# Patient Record
Sex: MALE | URBAN - METROPOLITAN AREA
[De-identification: names, ages, dates, MRNs, and addresses within clinical notes are randomized per-mention and may not be internally consistent; named-entity substitution may affect disease eponyms.]

---

## 2024-07-25 ENCOUNTER — TELEPHONE (OUTPATIENT)
Age: 59
End: 2024-07-25

## 2024-07-25 NOTE — TELEPHONE ENCOUNTER
Caller: Patient     Doctor: Clement-previous patient     Reason for call: Patient was asking if Dr Vaughan would have his medical records from when he treated with him at [facility]. Advised him that he would not, they would stay with the practice-which merged with [facility]. I checked in Care Everywhere but no information was there. I looked up the phone number for [facility] med recs dept and advised patient they have a form online that he could submit via fax or email as well.  He will call them     Call back#: n/a

## 2024-09-04 ENCOUNTER — TELEPHONE (OUTPATIENT)
Age: 59
End: 2024-09-04

## 2024-09-04 NOTE — TELEPHONE ENCOUNTER
Caller: Patient     Doctor/Office: Clement    Call regarding :  Returning call to in regards to insurance information      Call was transferred to: Chiro

## 2024-09-10 ENCOUNTER — OFFICE VISIT (OUTPATIENT)
Age: 59
End: 2024-09-10
Payer: COMMERCIAL

## 2024-09-10 VITALS
SYSTOLIC BLOOD PRESSURE: 140 MMHG | DIASTOLIC BLOOD PRESSURE: 88 MMHG | HEART RATE: 85 BPM | BODY MASS INDEX: 34.86 KG/M2 | HEIGHT: 68 IN | WEIGHT: 230 LBS

## 2024-09-10 DIAGNOSIS — M54.2 NECK PAIN: Primary | ICD-10-CM

## 2024-09-10 DIAGNOSIS — M21.371 FOOT DROP, RIGHT: ICD-10-CM

## 2024-09-10 DIAGNOSIS — M54.41 CHRONIC BILATERAL LOW BACK PAIN WITH RIGHT-SIDED SCIATICA: ICD-10-CM

## 2024-09-10 DIAGNOSIS — M79.18 MYOFASCIAL PAIN: ICD-10-CM

## 2024-09-10 DIAGNOSIS — S16.1XXA STRAIN OF NECK MUSCLE, INITIAL ENCOUNTER: ICD-10-CM

## 2024-09-10 DIAGNOSIS — G89.29 CHRONIC BILATERAL LOW BACK PAIN WITH RIGHT-SIDED SCIATICA: ICD-10-CM

## 2024-09-10 PROCEDURE — 99204 OFFICE O/P NEW MOD 45 MIN: CPT | Performed by: CHIROPRACTOR

## 2024-09-11 ENCOUNTER — TELEPHONE (OUTPATIENT)
Age: 59
End: 2024-09-11

## 2024-09-11 NOTE — TELEPHONE ENCOUNTER
I called O  ([facility]) at [phone] #1 and also [phone] MRO# [id number] to try to get imaging of patients study 2-9-2004 pushed through electronically do to wrong date of birth. Image is in [place] but was told they could not find it with wrong date of birth or correct date of birth at this time.

## 2024-09-19 ENCOUNTER — PROCEDURE VISIT (OUTPATIENT)
Age: 59
End: 2024-09-19
Payer: OTHER MISCELLANEOUS

## 2024-09-19 VITALS — BODY MASS INDEX: 34.86 KG/M2 | HEIGHT: 68 IN | WEIGHT: 230 LBS

## 2024-09-19 DIAGNOSIS — M54.16 LUMBAR RADICULOPATHY: ICD-10-CM

## 2024-09-19 DIAGNOSIS — M21.371 FOOT DROP, RIGHT: ICD-10-CM

## 2024-09-19 DIAGNOSIS — G89.29 CHRONIC BILATERAL LOW BACK PAIN WITH RIGHT-SIDED SCIATICA: ICD-10-CM

## 2024-09-19 DIAGNOSIS — M54.2 NECK PAIN: Primary | ICD-10-CM

## 2024-09-19 DIAGNOSIS — M54.41 CHRONIC BILATERAL LOW BACK PAIN WITH RIGHT-SIDED SCIATICA: ICD-10-CM

## 2024-09-19 PROCEDURE — 98941 CHIROPRACT MANJ 3-4 REGIONS: CPT | Performed by: CHIROPRACTOR

## 2024-09-19 NOTE — TELEPHONE ENCOUNTER
Caller: Patient    Doctor: Clement    Reason for call: Patient was in to see you and wants to follow up during the time you are on vacation and wondered if he would be able to see Dr. Redman on [facility] [place] during that time. Please call patient. Thank you.    Call back#: [phone]

## 2024-09-23 ENCOUNTER — OFFICE VISIT (OUTPATIENT)
Age: 59
End: 2024-09-23
Payer: OTHER MISCELLANEOUS

## 2024-09-23 VITALS — BODY MASS INDEX: 34.86 KG/M2 | WEIGHT: 230 LBS | HEIGHT: 68 IN

## 2024-09-23 DIAGNOSIS — M21.371 FOOT DROP, RIGHT: ICD-10-CM

## 2024-09-23 DIAGNOSIS — M54.2 NECK PAIN: Primary | ICD-10-CM

## 2024-09-23 DIAGNOSIS — M54.16 LUMBAR RADICULOPATHY: ICD-10-CM

## 2024-09-23 DIAGNOSIS — G89.29 CHRONIC BILATERAL LOW BACK PAIN WITH RIGHT-SIDED SCIATICA: ICD-10-CM

## 2024-09-23 DIAGNOSIS — M54.41 CHRONIC BILATERAL LOW BACK PAIN WITH RIGHT-SIDED SCIATICA: ICD-10-CM

## 2024-09-23 PROCEDURE — 98941 CHIROPRACT MANJ 3-4 REGIONS: CPT | Performed by: CHIROPRACTOR

## 2024-09-23 PROCEDURE — 97140 MANUAL THERAPY 1/> REGIONS: CPT | Performed by: CHIROPRACTOR

## 2024-09-30 NOTE — PROGRESS NOTES
HPI:  Casper returns for treatment in regards to neck pain upper back pain increased lower back and right lower extremity pain.  He is a 7 on a pain scale.  He notes increased pain in his dropfoot.      The following portions of the patient's history were reviewed and updated as appropriate: allergies, current medications, past family history, past medical history, past social history, past surgical history, and problem list.    Review of Systems    Physical Exam:  Exam today reveals a pelvic obliquity leg with any quality biomechanically joint dysfunction right sacroiliac joint involvement L4-L5 L5-S1 active myofascial triggers right gluteus medius, piriformis, TFL, and iliotibial band.  Lumbar range of motion reduced.    Paracervical hypertonicity noted myofascial involvement of the bilateral trapezii, rhomboid, and levator scapula musculature.  Decreased cervical range of motion.  Joint dysfunction T4-T5 C5-C6    Assessment:   Diagnosis ICD-10-CM Associated Orders   1. Neck pain  M54.2       2. Chronic bilateral low back pain with right-sided sciatica  M54.41     G89.29       3. Lumbar radiculopathy  M54.16       4. Foot drop, right  M21.371                 Treatment: 25691  Manipulation to the right innominate, sacrum, L5 via drop maneuver well-tolerated pretreat MFR right hip    Therapeutic myofascial release to bilateral shoulder girdle utilizing deep tissue compete ischemic compression and mobilization techniques of bilateral shoulder girdles well-tolerated 12-minute procedure.    Discussion:  Once again discussed the case with the patient today continue on with treatment as well as his previously established protocol.

## 2024-09-30 NOTE — PROGRESS NOTES
HPI:  Casper returns for treatment in regards to neck pain upper back pain increased lower back and right lower extremity pain.  He is a 7-8 on a pain scale.  He notes increased pain in his dropfoot.      The following portions of the patient's history were reviewed and updated as appropriate: allergies, current medications, past family history, past medical history, past social history, past surgical history, and problem list.    Review of Systems    Physical Exam:  Exam today reveals a pelvic obliquity leg with any quality biomechanically joint dysfunction right sacroiliac joint involvement L4-L5 L5-S1 active myofascial triggers right gluteus medius, piriformis, TFL, and iliotibial band.  Lumbar range of motion reduced.    Paracervical hypertonicity noted myofascial involvement of the bilateral trapezii, rhomboid, and levator scapula musculature.  Decreased cervical range of motion.  Joint dysfunction T4-T5 C5-C6    Assessment:   Diagnosis ICD-10-CM Associated Orders   1. Neck pain  M54.2       2. Chronic bilateral low back pain with right-sided sciatica  M54.41     G89.29       3. Lumbar radiculopathy  M54.16       4. Foot drop, right  M21.371                 Treatment: 69683  Manipulation to the right innominate, sacrum, L5 via drop maneuver well-tolerated pretreat MFR right hip    Discussion:  Once again discussed the case with the patient today continue on with treatment as well as his previously established protocol.

## 2024-09-30 NOTE — PROGRESS NOTES
"    HPI:  59-year-old male presents for complex evaluation and treatment in regards through injury sustained in June of this year while crawling under a desk to the neck and upper back with exacerbation of her pre-existing chronic low back and right leg foot drop.  He reports no fever chills night sweats or infection he relates no changes in bowel bladder other than those in regards to his IBS.  He is currently under care from pain management, physical therapy, massage therapy, and exercise and personal stretching.    Back Pain  Associated symptoms include headaches, numbness and weakness.     Casper Jesus is a 59 y.o. male who presents for a complex consultation evaluation and possible treatment regards to ongoing neck upper back lower back pain.  He reports that while working on June 11 of this year he had to crawl underneath his desk to make adjustments for cables going to a computer he was in a very awkward position and felt his back tighten later that day it \"locked up\".  In addition his neck began to spasm and tightening as well.  Since that time he has had ongoing symptomatology of neck pain stiffness and upper back tightness along with a worsening of his lower back and right lower extremity symptoms.    Casper related an extremely complicated and extensive history of low back issues including herniated disc subsequent back surgery and then a work related right foot drop.  Since that time he has had extensive treatment including physical medicine, physical therapy, chiropractic intervention, massage therapy, and supervised personal training which focused on stretching and mobilization.  This is gone on since the late 1990s and early 2000's.  He found that he was able to maintain with the above-mentioned treatment on a regular basis.  He was able to maintain a personal business and that.    However after the events of June of this year noted a significant increase in his low back symptoms as well as " functional disabilities from his foot drop on the right.  He has been unable to maintain even sedentary activities and his business.  He notes that being sedentary now exacerbates his symptomatology.    Localizes pain to the axial cervical spine tightness into the upper back without significant radiculopathy.  He reports low back pain bilateral with right parasacral being worse than left and ongoing previously described foot drop.    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  Chief Complaint   Patient presents with    Neck - Pain     Neck pain that radiates down both shoulders that is intermittent. Pain score  6  Patient states sore and tight       Back Pain     Lower lumbar pain that radiates down buttocks, both legs and into feet. Patient states has drop foot . Patient states tight, can not sit more than 20 minutes and sore. Pain score 6     History reviewed. No pertinent past medical history.   History reviewed. No pertinent surgical history.  History reviewed. No pertinent family history.  Social History     Socioeconomic History    Marital status: Unknown     Spouse name: None    Number of children: None    Years of education: None    Highest education level: None   Occupational History    None   Tobacco Use    Smoking status: Never    Smokeless tobacco: Never   Vaping Use    Vaping status: Never Used   Substance and Sexual Activity    Alcohol use: Not Currently    Drug use: None    Sexual activity: None   Other Topics Concern    None   Social History Narrative    None     Social Determinants of Health     Financial Resource Strain: Not on file   Food Insecurity: Not on file   Transportation Needs: Not on file   Physical Activity: Not on file   Stress: Not on file   Social Connections: Not on file   Intimate Partner Violence: Not on file   Housing Stability: Not on file     No current outpatient  medications on file.  Allergies as of 09/10/2024    (Not on File)         The following portions of the patient's history were reviewed and updated as appropriate: allergies, current medications, past family history, past medical history, past social history, past surgical history, and problem list.    Review of Systems   Constitutional:  Positive for activity change and fatigue.   Eyes:  Positive for visual disturbance.   Musculoskeletal:  Positive for arthralgias, back pain, gait problem, myalgias, neck pain and neck stiffness.   Neurological:  Positive for weakness, numbness and headaches.   Psychiatric/Behavioral: Negative.         Physical Exam:  Physical Exam  Vitals reviewed.   Constitutional:       Appearance: Normal appearance.   Cardiovascular:      Rate and Rhythm: Normal rate.      Pulses: Normal pulses.   Pulmonary:      Effort: Pulmonary effort is normal.   Musculoskeletal:      Cervical back: Spasms and tenderness present. Pain with movement present. Decreased range of motion.      Thoracic back: Spasms and tenderness present. Decreased range of motion.      Lumbar back: Spasms and tenderness present. Decreased range of motion. Positive right straight leg raise test. Negative left straight leg raise test.        Back:       Comments: Pelvic obliquity noted elevated left versus right innominate internal rotation of the right innominate on sacrum tenderness upon palpation of the right parasacral region.  Biomechanical joint dysfunction present the right sacroiliac joint involvement at L5-S1 L4-L5.  Active myofascial triggers in the right piriformis gluteus medius TFL musculature and right iliotibial band.    Paracervical hypertonicity noted with biomechanical joint dysfunction C5-C6 C6-C7 T4-T5   Neurological:      Mental Status: He is alert. Mental status is at baseline.      Sensory: Sensation is intact.      Motor: Weakness and atrophy present.      Coordination: Coordination abnormal.      Gait:  Gait abnormal.      Deep Tendon Reflexes: Reflexes are normal and symmetric.      Comments: Negative Spurling's maneuver.  Straight leg raising on the right exacerbates right-sided low back pain posterior thigh pain.  Patient does wear a MOFO brace and has weakness right foot and ankle per foot drop   Psychiatric:         Mood and Affect: Mood normal.         Behavior: Behavior normal.         Thought Content: Thought content normal.         Assessment:  Diagnoses and all orders for this visit:    Neck pain  -     XR spine cervical complete 4 or 5 vw non injury; Future    Strain of neck muscle, initial encounter    Myofascial pain    Chronic bilateral low back pain with right-sided sciatica    Foot drop, right           Discussion:  I have reviewed an extensive amount of past medical records spending over 30 minutes plus reviewing.  Discussed the case in detail with the patient today I feel that he definitely has mechanical and myofascial involvement in the cervical thoracic spine that should be amenable to care.  In addition as he has had benefit from chiropractic intervention in regards to his lower back we will provide treatment to this region as well.  He is establishing his other modes of treatment including stretching, massage therapy, and physical therapy.  He has also been seen in pain management.  Reassessment 4 weeks.  No follow-ups on file.

## 2024-10-02 ENCOUNTER — OFFICE VISIT (OUTPATIENT)
Age: 59
End: 2024-10-02
Payer: OTHER MISCELLANEOUS

## 2024-10-02 VITALS — HEIGHT: 68 IN | BODY MASS INDEX: 34.86 KG/M2 | WEIGHT: 230 LBS

## 2024-10-02 DIAGNOSIS — M54.41 CHRONIC BILATERAL LOW BACK PAIN WITH RIGHT-SIDED SCIATICA: ICD-10-CM

## 2024-10-02 DIAGNOSIS — G89.29 CHRONIC BILATERAL LOW BACK PAIN WITH RIGHT-SIDED SCIATICA: ICD-10-CM

## 2024-10-02 DIAGNOSIS — M79.18 MYOFASCIAL PAIN: ICD-10-CM

## 2024-10-02 DIAGNOSIS — M54.2 NECK PAIN: Primary | ICD-10-CM

## 2024-10-02 DIAGNOSIS — M21.371 FOOT DROP, RIGHT: ICD-10-CM

## 2024-10-02 DIAGNOSIS — M54.16 LUMBAR RADICULOPATHY: ICD-10-CM

## 2024-10-02 PROCEDURE — 99212 OFFICE O/P EST SF 10 MIN: CPT | Performed by: CHIROPRACTOR

## 2024-10-02 PROCEDURE — 98941 CHIROPRACT MANJ 3-4 REGIONS: CPT | Performed by: CHIROPRACTOR

## 2024-10-02 NOTE — PROGRESS NOTES
Assessment/Plan:    1. Neck pain        2. Chronic bilateral low back pain with right-sided sciatica        3. Lumbar radiculopathy        4. Foot drop, right        5. Myofascial pain            Review of Diagnostic Studies and Office Notes:    The patient's l-spine x-ray report, Dr. Vaughan notes were reviewed prior to the chiropractic evaluation today.  I reviewed Dr. Xiong's notes.      Decision and Treatment Plan:    I reviewed my findings with the patient today.  Patient was interested in receiving chiropractic care and was treated today.  We will treat the patient 1x/week for the next three weeks and re-evaluate. If there is improvement in symptoms and objective findings, frequency will be reduced.       The patient will be treated with conservative chiropractic care including myofascial release, joint mobilization as given and icing program.    Patient Instructions:    Return for treatment once next week.  Ice 15 minutes 2-3 times per day to the lower back.      TIME/Reviewed with Patient:  Time:  At least 25 minutes of time was spent with the patient during the consultation including the patient's history/current complaints, physical exam, reviewing the diagnostic report, reviewing home instruction/reducing risk factors with the patient, reviewing my findings with the patient, and discussing the treatment with the patient.     This is a separate identifiable portion of today's visit from the E and M code billed.    Treatment today consisted of myofascial release to the lumbar paraspinals, quadratus lumborum, gluteus medius bilaterally.  Myofascial release was performed to the upper trapezius, levator scapula, left rhomboids.    Manipulation was performed of the right SI joint, L5-S1 utilizing Sorenson drop technique.  No HVLA was performed..    Review of Systems   Constitutional:  Negative for chills, fever and unexpected weight change.   Gastrointestinal:  Negative for constipation and diarrhea.  "  Genitourinary:  Negative for difficulty urinating and urgency.       Subjective:      Casper Jesus is a 59 y.o. male presents today for chiropractic evaluation and possible treatment.  He is here today while Dr. Vaughan is out on vacation.  He has an extensive history regarding his lower back.  His lower back issues go back to 1982.  He states he had a discectomy in 1982.  He states in 1998 he reinjured his back he states he went to physical therapy and it did improve but he still had chronic issues.  He states in 2004 he had an MRI. He has chronic LBP and right foot drop.   Recently while at work he was under his desk trying to fix some wires to his computer when he experienced increased LBP. 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 He also states he has neck pain.  He is following with pain management, Inga Damon. He started chiropractic care with Dr. Vaughan. He did have PT in the distant past as well as chiropractic care. He also follows with his PCP.  He states he is scheduled for an MRI.     He denies fever, chills, night sweats, recent unexplained weight loss, urinary incontinence or retention.      Objective:     Ht 5' 8\" (1.727 m)   Wt 104 kg (230 lb)   BMI 34.97 kg/m²     Appearance: Well developed, well nourished, and in no acute distress    Alert and oriented x 3    Mood/Affect: calm and pleasant    Gait/Posture:    Gait: Wearing a MAFO brace on the right. Steppage gait noted.      Lordosis: Cervical- decreased    Lordosis: Lumbar- normal    Kyphosis: Thoracic- normal    Upper extremity reflexes:    Deep tendon reflexes were normal and symmetrical in the upper extremities.    Upper Extremity Muscle Testing:    Muscle testing of the bilateral upper extremities was normal and graded +5/5.    Burnett's was negative bilaterally.    Cervical Orthopedic Tests:    Maximal foraminal Compression on the Right: negative .    Maximal foraminal Compression on the Left: negative .    Active Cervical Ranges of " Motion:    Cervical range motion examination shows flexion to be 20% restricted with pain.  Extension is mildly painful.  Left rotation is 30% restricted with pain.  Right rotation is 20% restricted with pain.  Lower extremity reflexes:    Patellar reflexes are 2+ and equal bilaterally.    Lower Extremity Muscle Testing:    Muscle testing of the bilateral lower extremities was normal and graded with the exception of eversion on the right graded 4/5.  Dorsiflexion and EHL strength are 0/5 on the right.    Lumbar Orthopedic Tests:    Seated Straight Leg Raise was negative  on the Right.    Seated Straight Leg Raise was negative  on the Left.    Supine Straight Leg Raise was positive on the Right.    Supine Straight Leg Raise was negative  on the Left.    Active Lumbar Ranges of Motion:    Lumbar range of motion examination shows flexion to be 40% restricted with pain.  Extension was not performed.  Left lateral bending is 60% restricted with pain.  Right lateral bending is 60% restricted with pain.    Palpation:    Spasm and tenderness is noted in the lumbar paraspinals, quadratus lumborum, gluteus medius more so on the right.  Spasm and tenderness is noted in the cervical paraspinals, left upper trapezius, rhomboids more so on the left.  Active trigger points are present in the left upper trapezius, rhomboids.    Joint dysfunction is present at the right sacroiliac joint, L5-S1.    Dictation voice to text software has been used in the creation of this document. Please consider this in light of any contextual or grammatical errors.

## 2024-10-10 ENCOUNTER — OFFICE VISIT (OUTPATIENT)
Age: 59
End: 2024-10-10
Payer: OTHER MISCELLANEOUS

## 2024-10-10 VITALS — WEIGHT: 230 LBS | HEIGHT: 68 IN | BODY MASS INDEX: 34.86 KG/M2

## 2024-10-10 DIAGNOSIS — M54.41 CHRONIC BILATERAL LOW BACK PAIN WITH RIGHT-SIDED SCIATICA: Primary | ICD-10-CM

## 2024-10-10 DIAGNOSIS — M54.2 NECK PAIN: ICD-10-CM

## 2024-10-10 DIAGNOSIS — G89.29 CHRONIC BILATERAL LOW BACK PAIN WITH RIGHT-SIDED SCIATICA: Primary | ICD-10-CM

## 2024-10-10 DIAGNOSIS — M21.371 FOOT DROP, RIGHT: ICD-10-CM

## 2024-10-10 DIAGNOSIS — M54.16 LUMBAR RADICULOPATHY: ICD-10-CM

## 2024-10-10 DIAGNOSIS — M79.18 MYOFASCIAL PAIN: ICD-10-CM

## 2024-10-10 PROCEDURE — 98941 CHIROPRACT MANJ 3-4 REGIONS: CPT | Performed by: CHIROPRACTOR

## 2024-10-10 RX ORDER — KETOROLAC TROMETHAMINE 10 MG/1
TABLET, FILM COATED ORAL
COMMUNITY

## 2024-10-10 RX ORDER — IBUPROFEN 200 MG
TABLET ORAL
COMMUNITY

## 2024-10-10 NOTE — PROGRESS NOTES
Assessment:     1. Chronic bilateral low back pain with right-sided sciatica        2. Lumbar radiculopathy        3. Foot drop, right        4. Myofascial pain        5. Neck pain            Condition is the same.    PLAN/TREATMENT:  Return for treatment once next week with Dr. Vaughan. He states he is already scheduled.  Continue using ice    Treatment:  Treatment today consisted of myofascial release to the lumbar paraspinals, quadratus lumborum, gluteus medius bilaterally.  Myofascial release was performed to the upper trapezius, levator scapula, left rhomboids.     Manipulation was performed of the right SI joint, L5-S1 utilizing Sorenson drop technique.  No HVLA was performed.    Subjective:   Casper is here today with ongoing LBP and neck pain.      Objective:  Spasm and tenderness is noted in the lumbar paraspinals, quadratus lumborum, gluteus medius more so on the right.  Spasm and tenderness is noted in the cervical paraspinals, left upper trapezius, rhomboids more so on the left.  Active trigger points are present in the left upper trapezius, rhomboids.     Joint dysfunction is present at the right sacroiliac joint, L5-S1.    MRI lumbar spine wo contrast - 10/7/24  Order: [id number]  Impression    Impression:    1.  Moderate to severe spinal stenosis at L2-L3.    2.  Moderate spinal stenosis and moderate bilateral foraminal narrowing at  L1-L2.      3.  Moderate bilateral foraminal narrowing at L3-L4.    4.  Moderate right foraminal narrowing at L4-L5.    5.  Small Schmorl's nodes with associated edema at T11-T12, L2-L3 and L3-L4.  Multilevel degenerative changes as above.        Workstation:[id number]  Narrative    History: Low back pain.    Procedure: MRI of the lumbar spine was obtained with the following sequences:  Sagittal T1, sagittal T2, sagittal STIR and axial T2 weighted images.    Comparison: 2/9/2004    Findings: For the purposes of this dictation, the lumbar vertebrae are labeled  from a caudal  to cranial direction, the first vertebra with lumbar morphology is  labeled as L5.    Conus and lower thoracic cord: The conus is normal in position and signal  intensity.    Marrow: There is endplate edema from degenerative disc disease and developing  Schmorl's nodes at T11-T12, L2-L3 and L3-L4.    Alignment: The lumbar vertebra are normal in alignment. The vertebral body  heights are maintained.    L1-L2: There is a disc bulge with a small central disc protrusion, facet  arthrosis and ligamentum flavum hypertrophy causing moderate spinal stenosis and  moderate bilateral foraminal narrowing.    L2-L3 : There is a disc bulge, bilateral facet arthrosis, ligamentum flavum  hypertrophy resulting in moderate to severe spinal stenosis and mild-to-moderate  bilateral foraminal narrowing.    L3-L4: There is a disc bulge, facet arthrosis resulting in mild-to-moderate  spinal stenosis and moderate bilateral foraminal narrowing, left greater than  right. There is suggestion of a prior right hemilaminotomy.    L4-L5: There is a disc bulge, bilateral foraminal disc osteophyte complexes and  hypertrophic facet arthrosis causing moderate right foraminal narrowing,  mild-to-moderate left foraminal narrowing and mild-to-moderate spinal stenosis.    L5-S1: There is a disc bulge and facet arthrosis without significant spinal  stenosis or foraminal narrowing.    Exam End: 10/07/24  3:04 PM   Reviewed above report and images.

## 2024-10-13 PROBLEM — M48.061 LUMBAR SPINAL STENOSIS: Status: ACTIVE | Noted: 2024-10-13

## 2024-10-17 ENCOUNTER — PROCEDURE VISIT (OUTPATIENT)
Age: 59
End: 2024-10-17
Payer: OTHER MISCELLANEOUS

## 2024-10-17 VITALS
WEIGHT: 230 LBS | HEIGHT: 68 IN | SYSTOLIC BLOOD PRESSURE: 138 MMHG | BODY MASS INDEX: 34.86 KG/M2 | DIASTOLIC BLOOD PRESSURE: 86 MMHG | HEART RATE: 87 BPM

## 2024-10-17 DIAGNOSIS — M21.371 FOOT DROP, RIGHT: ICD-10-CM

## 2024-10-17 DIAGNOSIS — M54.16 LUMBAR RADICULOPATHY: ICD-10-CM

## 2024-10-17 DIAGNOSIS — M79.18 MYOFASCIAL PAIN: ICD-10-CM

## 2024-10-17 DIAGNOSIS — M54.2 NECK PAIN: ICD-10-CM

## 2024-10-17 DIAGNOSIS — M54.41 CHRONIC BILATERAL LOW BACK PAIN WITH RIGHT-SIDED SCIATICA: Primary | ICD-10-CM

## 2024-10-17 DIAGNOSIS — G89.29 CHRONIC BILATERAL LOW BACK PAIN WITH RIGHT-SIDED SCIATICA: Primary | ICD-10-CM

## 2024-10-17 PROCEDURE — 98941 CHIROPRACT MANJ 3-4 REGIONS: CPT | Performed by: CHIROPRACTOR

## 2024-10-17 PROCEDURE — 97140 MANUAL THERAPY 1/> REGIONS: CPT | Performed by: CHIROPRACTOR

## 2024-10-17 NOTE — PROGRESS NOTES
HPI:  Casper returns for treatment in regards to neck pain upper back pain increased lower back and right lower extremity pain.  He is a 8 on a pain scale.  He reports that his neck is slightly better 6-7 on a pain scale.  Today he describes left-sided rib cage pain anteriorly.  He also relates a limited sitting tolerance of approximately 20 minutes where he will get pain in the right parasacral and hip region any longer periods will then generate pain throughout the lumbar spine.      The following portions of the patient's history were reviewed and updated as appropriate: allergies, current medications, past family history, past medical history, past social history, past surgical history, and problem list.    Review of Systems    Physical Exam:  Exam today reveals a pelvic obliquity leg with any quality biomechanically joint dysfunction right sacroiliac joint involvement L4-L5 L5-S1 active myofascial triggers right gluteus medius, piriformis, TFL, and iliotibial band.  Lumbar range of motion reduced.    Paracervical hypertonicity noted myofascial involvement of the bilateral trapezii, rhomboid, and levator scapula musculature.  Decreased cervical range of motion.  Joint dysfunction T4-T5 C5-C6    Assessment:   Diagnosis ICD-10-CM Associated Orders   1. Chronic bilateral low back pain  M54.41     G89.29       2. Lumbar radiculopathy  M54.16       3. Foot drop, right  M21.371       4. Myofascial pain  M79.18       5. Neck pain  M54.2                 Treatment: 74945  Manipulation to the right innominate, sacrum, L5 via drop maneuver well-tolerated pretreat MFR right hip manipulation T6.    Therapeutic myofascial release to bilateral shoulder girdle utilizing deep tissue compete ischemic compression and mobilization techniques of bilateral shoulder girdles well-tolerated 12-minute procedure.    Discussion:  Once again discussed the case with the patient today continue on with treatment as well as his  previously established protocol.

## 2024-10-24 ENCOUNTER — OFFICE VISIT (OUTPATIENT)
Age: 59
End: 2024-10-24
Payer: OTHER MISCELLANEOUS

## 2024-10-24 VITALS
WEIGHT: 230 LBS | SYSTOLIC BLOOD PRESSURE: 127 MMHG | HEIGHT: 68 IN | BODY MASS INDEX: 34.86 KG/M2 | HEART RATE: 85 BPM | DIASTOLIC BLOOD PRESSURE: 85 MMHG

## 2024-10-24 DIAGNOSIS — M21.371 FOOT DROP, RIGHT: ICD-10-CM

## 2024-10-24 DIAGNOSIS — G89.29 CHRONIC BILATERAL LOW BACK PAIN WITH RIGHT-SIDED SCIATICA: Primary | ICD-10-CM

## 2024-10-24 DIAGNOSIS — M54.2 NECK PAIN: ICD-10-CM

## 2024-10-24 DIAGNOSIS — M54.16 LUMBAR RADICULOPATHY: ICD-10-CM

## 2024-10-24 DIAGNOSIS — M79.18 MYOFASCIAL PAIN: ICD-10-CM

## 2024-10-24 DIAGNOSIS — M54.41 CHRONIC BILATERAL LOW BACK PAIN WITH RIGHT-SIDED SCIATICA: Primary | ICD-10-CM

## 2024-10-24 PROCEDURE — 97140 MANUAL THERAPY 1/> REGIONS: CPT | Performed by: CHIROPRACTOR

## 2024-10-24 PROCEDURE — 98941 CHIROPRACT MANJ 3-4 REGIONS: CPT | Performed by: CHIROPRACTOR

## 2024-10-24 NOTE — PROGRESS NOTES
HPI:  Casper returns for treatment in regards to neck pain upper back pain increased lower back and right lower extremity pain.  He is a 7-8 on a pain scale.  He reports that his neck is slightly better 6-7 on a pain scale.   He also relates a limited sitting tolerance of approximately 20 minutes where he will get pain in the right parasacral and hip region any longer periods will then generate pain throughout the lumbar spine.      The following portions of the patient's history were reviewed and updated as appropriate: allergies, current medications, past family history, past medical history, past social history, past surgical history, and problem list.    Review of Systems    Physical Exam:  Exam today reveals a pelvic obliquity leg with any quality biomechanically joint dysfunction right sacroiliac joint involvement L4-L5 L5-S1 active myofascial triggers right gluteus medius, piriformis, TFL, and iliotibial band.  Lumbar range of motion reduced.    Paracervical hypertonicity noted myofascial involvement of the bilateral trapezii, rhomboid, and levator scapula musculature.  Decreased cervical range of motion.  Joint dysfunction T4-T5 C5-C6    Assessment:   Diagnosis ICD-10-CM Associated Orders   1. Chronic bilateral low back pain  M54.41     G89.29       2. Lumbar radiculopathy  M54.16       3. Foot drop, right  M21.371       4. Myofascial pain  M79.18       5. Neck pain  M54.2                 Treatment: 39129  Manipulation to the right innominate, sacrum, L5 via drop maneuver well-tolerated pretreat MFR right hip manipulation T6.    Therapeutic myofascial release to bilateral shoulder girdle utilizing deep tissue compete ischemic compression and mobilization techniques of bilateral shoulder girdles well-tolerated 12-minute procedure.    Discussion:  Once again discussed the case with the patient today continue on with treatment as well as his previously established protocol.

## 2024-10-31 ENCOUNTER — OFFICE VISIT (OUTPATIENT)
Age: 59
End: 2024-10-31
Payer: OTHER MISCELLANEOUS

## 2024-10-31 VITALS
HEART RATE: 87 BPM | BODY MASS INDEX: 34.86 KG/M2 | DIASTOLIC BLOOD PRESSURE: 85 MMHG | WEIGHT: 230 LBS | HEIGHT: 68 IN | SYSTOLIC BLOOD PRESSURE: 122 MMHG

## 2024-10-31 DIAGNOSIS — M54.2 NECK PAIN: ICD-10-CM

## 2024-10-31 DIAGNOSIS — M54.41 CHRONIC BILATERAL LOW BACK PAIN WITH RIGHT-SIDED SCIATICA: Primary | ICD-10-CM

## 2024-10-31 DIAGNOSIS — M21.371 FOOT DROP, RIGHT: ICD-10-CM

## 2024-10-31 DIAGNOSIS — M79.18 MYOFASCIAL PAIN: ICD-10-CM

## 2024-10-31 DIAGNOSIS — S16.1XXA STRAIN OF NECK MUSCLE, INITIAL ENCOUNTER: ICD-10-CM

## 2024-10-31 DIAGNOSIS — M54.16 LUMBAR RADICULOPATHY: ICD-10-CM

## 2024-10-31 DIAGNOSIS — G89.29 CHRONIC BILATERAL LOW BACK PAIN WITH RIGHT-SIDED SCIATICA: Primary | ICD-10-CM

## 2024-10-31 PROCEDURE — 97140 MANUAL THERAPY 1/> REGIONS: CPT | Performed by: CHIROPRACTOR

## 2024-10-31 PROCEDURE — 98941 CHIROPRACT MANJ 3-4 REGIONS: CPT | Performed by: CHIROPRACTOR

## 2024-11-24 NOTE — PROGRESS NOTES
HPI:  Casper returns for treatment in regards to neck pain upper back pain increased lower back and right lower extremity pain.  He is a 7-8 on a pain scale.  He reports that his neck is slightly better.  He reports bilateral wrist pain.   He also relates a limited sitting tolerance of approximately 20 minutes where he will get pain in the right parasacral and hip region any longer periods will then generate pain throughout the lumbar spine.      The following portions of the patient's history were reviewed and updated as appropriate: allergies, current medications, past family history, past medical history, past social history, past surgical history, and problem list.    Review of Systems    Physical Exam:  Exam today reveals a pelvic obliquity leg with any quality biomechanically joint dysfunction right sacroiliac joint involvement L4-L5 L5-S1 active myofascial triggers right gluteus medius, piriformis, TFL, and iliotibial band.  Lumbar range of motion reduced.    Paracervical hypertonicity noted myofascial involvement of the bilateral trapezii, rhomboid, and levator scapula musculature.  Decreased cervical range of motion.  Joint dysfunction T4-T5 C5-C6    Assessment:   Diagnosis ICD-10-CM Associated Orders   1. Chronic bilateral low back pain  M54.41     G89.29       2. Lumbar radiculopathy  M54.16       3. Foot drop, right  M21.371       4. Myofascial pain  M79.18       5. Neck pain  M54.2       6. Strain of neck muscle, initial encounter  S16.1XXA                 Treatment: [id number]  Manipulation to the right innominate, sacrum, L5 via drop maneuver well-tolerated pretreat MFR right hip manipulation T6.    Therapeutic myofascial release to bilateral shoulder girdle utilizing deep tissue compete ischemic compression and mobilization techniques of bilateral shoulder girdles well-tolerated 12-minute procedure.    Discussion:  Once again discussed the case with the patient today continue on with treatment as  well as his previously established protocol.

## 2025-06-09 ENCOUNTER — OFFICE VISIT (OUTPATIENT)
Age: 60
End: 2025-06-09
Payer: OTHER MISCELLANEOUS

## 2025-06-09 DIAGNOSIS — M21.371 FOOT DROP, RIGHT: ICD-10-CM

## 2025-06-09 DIAGNOSIS — M54.16 LUMBAR RADICULOPATHY: ICD-10-CM

## 2025-06-09 DIAGNOSIS — M79.18 MYOFASCIAL PAIN: ICD-10-CM

## 2025-06-09 DIAGNOSIS — G89.29 CHRONIC BILATERAL THORACIC BACK PAIN: ICD-10-CM

## 2025-06-09 DIAGNOSIS — M54.41 CHRONIC BILATERAL LOW BACK PAIN WITH RIGHT-SIDED SCIATICA: Primary | ICD-10-CM

## 2025-06-09 DIAGNOSIS — G89.29 CHRONIC BILATERAL LOW BACK PAIN WITH RIGHT-SIDED SCIATICA: Primary | ICD-10-CM

## 2025-06-09 DIAGNOSIS — M54.6 CHRONIC BILATERAL THORACIC BACK PAIN: ICD-10-CM

## 2025-06-09 DIAGNOSIS — M54.2 NECK PAIN: ICD-10-CM

## 2025-06-09 PROCEDURE — 99213 OFFICE O/P EST LOW 20 MIN: CPT | Performed by: CHIROPRACTOR

## 2025-06-09 PROCEDURE — 98941 CHIROPRACT MANJ 3-4 REGIONS: CPT | Performed by: CHIROPRACTOR

## 2025-06-09 NOTE — PROGRESS NOTES
Assessment:     1. Chronic bilateral low back pain        2. Lumbar radiculopathy        3. Foot drop, right        4. Myofascial pain        5. Neck pain        6. Chronic bilateral thoracic back pain              PLAN/TREATMENT:  Return for treatment once per week.  Can make an appointment for when I return to the office.  Continue using ice    Treatment:  Treatment today consisted of myofascial release to the lumbar paraspinals, quadratus lumborum, gluteus medius bilaterally.  Myofascial release was performed to the upper trapezius, levator scapula, left rhomboids.  Myofascial release was performed to the thoracic paraspinals and middle trapezius.     Manipulation was performed of the right SI joint, L5-S1 utilizing Sorenson drop technique.  Manipulation was performed to the thoracic spine at T3-4 and T5-6 utilizing activator technique.  No HVLA was performed.  No HVLA was performed.    Subjective:   Casper is here today with ongoing LBP and neck pain.  We last saw him in October 2024.  At that point he saw Dr. Vaughan for a few visits and then went to [place] for about 6 months.  He states he just returned from [place] a few days ago.  While he was in [place] he states he had an increase in lower back pain from November to December and it started to improve and January and February.  He states while in [place] he was going to the chiropractor once a week and also to physical therapy twice per week.  He also is going for massage therapy once per week.  He states his symptoms had improved but never resolved even with all that treatment.  He is here to see if he can continue with treatment.  He does have an appointment to see his PCP.  He has not yet started physical therapy.  He localizes the pain to the lower back and points to the SI joints.  He states that does go from side-to-side.  He has the ongoing right foot weakness related to chronic foot drop.  He states that he feels he made a little bit of headway in  terms of the weakness in the leg with the physical therapy.    In addition he gets pain in the mid back and neck.  He gets stiff achy and tight.  Chiropractic care was helpful to reduce his symptoms.    He reports that in [place] he consulted with a spine surgeon.  He states his spine surgeon recommended that he do surgery for the lumbar stenosis including putting in hardware.  Patient was not ready to do surgery and would rather continue with his conservative treatment for now.  He states he did talk to Dr. Xiong on a telemedicine visit in February.  He has been seeing Dr. Xiong up here when he is in the area.  He has not had any recent injections.      Objective:  Moderate spasm and tenderness is noted in the lumbar paraspinals, quadratus lumborum, gluteus medius more so on the right.  Moderate spasm and tenderness is noted in the cervical paraspinals, left upper trapezius, rhomboids more so on the left.  Active trigger points are present in the left upper trapezius, rhomboids.  Moderate spasm and tenderness is noted in the thoracic paraspinals and middle trapezius bilaterally.    Joint dysfunction is present at the right sacroiliac joint, L5-S1, T3-4, T5-6.    Joint dysfunction is present at the right sacroiliac joint, L5-S1.      Lower extremity reflexes are 2+ on the left, 1+ at the patella and Achilles on the right.    Lower extremity muscle strength testing is 5/5 bilaterally with the exception of dorsiflexion and EHL which are 0 out of 5 on the right.  Eversion is 4/5 on the right.    Seated straight leg raise was positive on the right, negative on the left.    Lumbar range of motion examination shows flexion to be 30 to 40% restricted.  Extension was not performed.  Left lateral bending is 50% restricted with pain.  Right lateral bending is 50% restricted with pain.        MRI lumbar spine wo contrast - 10/7/24  Order: [id number]  Impression    Impression:    1.  Moderate to severe spinal stenosis at  L2-L3.    2.  Moderate spinal stenosis and moderate bilateral foraminal narrowing at  L1-L2.      3.  Moderate bilateral foraminal narrowing at L3-L4.    4.  Moderate right foraminal narrowing at L4-L5.    5.  Small Schmorl's nodes with associated edema at T11-T12, L2-L3 and L3-L4.  Multilevel degenerative changes as above.        Workstation:[id number]  Narrative    History: Low back pain.    Procedure: MRI of the lumbar spine was obtained with the following sequences:  Sagittal T1, sagittal T2, sagittal STIR and axial T2 weighted images.    Comparison: 2/9/2004    Findings: For the purposes of this dictation, the lumbar vertebrae are labeled  from a caudal to cranial direction, the first vertebra with lumbar morphology is  labeled as L5.    Conus and lower thoracic cord: The conus is normal in position and signal  intensity.    Marrow: There is endplate edema from degenerative disc disease and developing  Schmorl's nodes at T11-T12, L2-L3 and L3-L4.    Alignment: The lumbar vertebra are normal in alignment. The vertebral body  heights are maintained.    L1-L2: There is a disc bulge with a small central disc protrusion, facet  arthrosis and ligamentum flavum hypertrophy causing moderate spinal stenosis and  moderate bilateral foraminal narrowing.    L2-L3 : There is a disc bulge, bilateral facet arthrosis, ligamentum flavum  hypertrophy resulting in moderate to severe spinal stenosis and mild-to-moderate  bilateral foraminal narrowing.    L3-L4: There is a disc bulge, facet arthrosis resulting in mild-to-moderate  spinal stenosis and moderate bilateral foraminal narrowing, left greater than  right. There is suggestion of a prior right hemilaminotomy.    L4-L5: There is a disc bulge, bilateral foraminal disc osteophyte complexes and  hypertrophic facet arthrosis causing moderate right foraminal narrowing,  mild-to-moderate left foraminal narrowing and mild-to-moderate spinal stenosis.    L5-S1: There is a disc bulge  and facet arthrosis without significant spinal  stenosis or foraminal narrowing.    Exam End: 10/07/24  3:04 PM       MRI cervical spine wo contrast  Order: [id number]  Impression    Impression:  1.  Multilevel degenerative changes of the cervical spine most pronounced at the  level of C6/C7 with mild/moderate spinal canal stenosis and severe bilateral  neuroforaminal narrowing.  2.  Mild/moderate spinal canal stenosis at C4/C5 with severe right and no  significant left neuroforaminal narrowing.  3.  Moderate/severe neuroforaminal narrowing on the left at C2/C3.  4.  No acute fracture.     Reviewed above report and images.